# Patient Record
Sex: MALE | Race: WHITE | NOT HISPANIC OR LATINO | Employment: FULL TIME | URBAN - METROPOLITAN AREA
[De-identification: names, ages, dates, MRNs, and addresses within clinical notes are randomized per-mention and may not be internally consistent; named-entity substitution may affect disease eponyms.]

---

## 2021-07-16 ENCOUNTER — OFFICE VISIT (OUTPATIENT)
Dept: INTERNAL MEDICINE | Facility: CLINIC | Age: 36
End: 2021-07-16

## 2021-07-16 VITALS
BODY MASS INDEX: 28.25 KG/M2 | SYSTOLIC BLOOD PRESSURE: 122 MMHG | TEMPERATURE: 98.3 F | OXYGEN SATURATION: 97 % | WEIGHT: 201.8 LBS | HEART RATE: 68 BPM | HEIGHT: 71 IN | DIASTOLIC BLOOD PRESSURE: 85 MMHG

## 2021-07-16 DIAGNOSIS — R06.09 DOE (DYSPNEA ON EXERTION): Primary | ICD-10-CM

## 2021-07-16 DIAGNOSIS — Z30.09 VASECTOMY EVALUATION: ICD-10-CM

## 2021-07-16 PROCEDURE — 99203 OFFICE O/P NEW LOW 30 MIN: CPT | Performed by: INTERNAL MEDICINE

## 2021-07-16 NOTE — PROGRESS NOTES
"Chief Complaint  Establish Care    Subjective          Job Woodward presents to [facility]  History of Present Illness  Previous PCP:  Edwina monae   Specialist(s): n/a  Immunizations: UTD  Colon cancer screening: n/a  Pt gets labs every year with [facility]    Pt would like to see urology for vasectomy follow-up.  Pt does report having intermittent SOB. 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 Pt reports having a cough for approx a year after he returned from deployment.   pt reports previous x-ray normal    Objective   Vital Signs:   /85 (BP Location: Left arm, Patient Position: Sitting, Cuff Size: Adult)   Pulse 68   Temp 98.3 °F (36.8 °C) (Temporal)   Ht 180.3 cm (71\")   Wt 91.5 kg (201 lb 12.8 oz)   SpO2 97%   BMI 28.15 kg/m²     Physical Exam   Appearance: No acute distress, well-nourished  Head: normocephalic, atraumatic  Eyes: extraocular movements intact, no scleral icterus, no conjunctival injection  Ears, Nose, and Throat: external ears normal, nares patent, moist mucous membranes  Cardiovascular: regular rate and rhythm. no murmurs, rales, or rhonchi. no edema  Respiratory: breathing comfortably, symmetric chest rise, clear to auscultation bilaterally. No wheezes, rales, or rhonchi.  Neuro: alert and oriented to time, place, and person. Normal gait  Psych: normal mood and affect     Assessment and Plan    Diagnoses and all orders for this visit:    1. JAVIER (dyspnea on exertion) (Primary)  -     Full Pulmonary Function Test With Bronchodilator; Future    2. Vasectomy evaluation  -     Ambulatory Referral to Urology        Follow Up   No follow-ups on file.  Patient was given instructions and counseling regarding his condition or for health maintenance advice. Please see specific information pulled into the AVS if appropriate.       "

## 2023-02-20 ENCOUNTER — OFFICE VISIT (OUTPATIENT)
Dept: INTERNAL MEDICINE | Facility: CLINIC | Age: 38
End: 2023-02-20
Payer: COMMERCIAL

## 2023-02-20 VITALS
BODY MASS INDEX: 30.3 KG/M2 | DIASTOLIC BLOOD PRESSURE: 92 MMHG | WEIGHT: 216.4 LBS | HEIGHT: 71 IN | SYSTOLIC BLOOD PRESSURE: 135 MMHG | OXYGEN SATURATION: 98 % | HEART RATE: 78 BPM | TEMPERATURE: 97.5 F

## 2023-02-20 DIAGNOSIS — Z31.41 FERTILITY TESTING: Primary | ICD-10-CM

## 2023-02-20 LAB
FSH SERPL-ACNC: 4.4 MIU/ML
LH SERPL-ACNC: 4.47 MIU/ML
PROLACTIN SERPL-MCNC: 12.8 NG/ML (ref 4.04–15.2)
TSH SERPL DL<=0.05 MIU/L-ACNC: 1.6 UIU/ML (ref 0.27–4.2)

## 2023-02-20 PROCEDURE — 84403 ASSAY OF TOTAL TESTOSTERONE: CPT | Performed by: STUDENT IN AN ORGANIZED HEALTH CARE EDUCATION/TRAINING PROGRAM

## 2023-02-20 PROCEDURE — 83002 ASSAY OF GONADOTROPIN (LH): CPT | Performed by: STUDENT IN AN ORGANIZED HEALTH CARE EDUCATION/TRAINING PROGRAM

## 2023-02-20 PROCEDURE — 84443 ASSAY THYROID STIM HORMONE: CPT | Performed by: STUDENT IN AN ORGANIZED HEALTH CARE EDUCATION/TRAINING PROGRAM

## 2023-02-20 PROCEDURE — 84402 ASSAY OF FREE TESTOSTERONE: CPT | Performed by: STUDENT IN AN ORGANIZED HEALTH CARE EDUCATION/TRAINING PROGRAM

## 2023-02-20 PROCEDURE — 84146 ASSAY OF PROLACTIN: CPT | Performed by: STUDENT IN AN ORGANIZED HEALTH CARE EDUCATION/TRAINING PROGRAM

## 2023-02-20 PROCEDURE — 99213 OFFICE O/P EST LOW 20 MIN: CPT | Performed by: STUDENT IN AN ORGANIZED HEALTH CARE EDUCATION/TRAINING PROGRAM

## 2023-02-20 PROCEDURE — 83001 ASSAY OF GONADOTROPIN (FSH): CPT | Performed by: STUDENT IN AN ORGANIZED HEALTH CARE EDUCATION/TRAINING PROGRAM

## 2023-02-20 RX ORDER — CHLORAL HYDRATE 500 MG
CAPSULE ORAL
COMMUNITY

## 2023-02-20 RX ORDER — SENNOSIDES 8.6 MG
TABLET ORAL
COMMUNITY

## 2023-02-20 RX ORDER — DIPHENOXYLATE HYDROCHLORIDE AND ATROPINE SULFATE 2.5; .025 MG/1; MG/1
TABLET ORAL DAILY
COMMUNITY

## 2023-02-20 RX ORDER — MELATONIN
1000 DAILY
COMMUNITY

## 2023-02-20 RX ORDER — ASCORBIC ACID 100 MG
TABLET,CHEWABLE ORAL
COMMUNITY

## 2023-02-20 NOTE — PROGRESS NOTES
"Chief Complaint  Fatigue (Discuss testosterone/ fertility lab work)    Subjective          Job Woodward presents to [facility]  History of Present Illness    Herre with wife.    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    Current Outpatient Medications   Medication Instructions   • Ascorbic Acid (Vitamin C) 100 MG chewable tablet Oral   • cholecalciferol (VITAMIN D3) 1,000 Units, Oral, Daily   • Coenzyme Q10 (CoQ-10) 400 MG capsule Oral   • multivitamin (ONE-A-DAY MENS PO) Oral, Daily, Pre-conception   • Nutritional Supplements (CONCEPTIONXR REPRODUCTIVE PO) Oral   • Omega-3 Fatty Acids (fish oil) 1000 MG capsule capsule Oral, Daily With Breakfast   • Zinc 50 MG capsule Oral       The following portions of the patient's history were reviewed and updated as appropriate: allergies, current medications, past family history, past medical history, past social history, past surgical history, and problem list.    Objective   Vital Signs:   /92   Pulse 78   Temp 97.5 °F (36.4 °C) (Temporal)   Ht 180.3 cm (71\")   Wt 98.2 kg (216 lb 6.4 oz)   SpO2 98%   BMI 30.18 kg/m²     Wt Readings from Last 3 Encounters:   02/20/23 98.2 kg (216 lb 6.4 oz)   07/16/21 91.5 kg (201 lb 12.8 oz)     BP Readings from Last 3 Encounters:   02/20/23 135/92   07/16/21 122/85     Physical Exam  Vitals reviewed.   Constitutional:       General: He is not in acute distress.     Appearance: Normal appearance. He is not ill-appearing, toxic-appearing or diaphoretic.   HENT:      Head: Normocephalic and atraumatic.      Right Ear: External ear normal.      Left Ear: External ear normal.   Eyes:      Conjunctiva/sclera: Conjunctivae normal.   Cardiovascular:      Rate and Rhythm: Normal rate and regular rhythm.      Pulses: Normal pulses.      Heart sounds: Normal heart sounds. No murmur heard.    No friction rub. No gallop.   Pulmonary:      Effort: " Pulmonary effort is normal. No respiratory distress.      Breath sounds: Normal breath sounds. No stridor. No wheezing, rhonchi or rales.   Chest:      Chest wall: No tenderness.   Abdominal:      General: Abdomen is flat.      Palpations: Abdomen is soft. There is no mass.      Tenderness: There is no abdominal tenderness.   Musculoskeletal:      Right lower leg: No edema.      Left lower leg: No edema.   Skin:     General: Skin is warm and dry.   Neurological:      General: No focal deficit present.      Mental Status: He is alert. Mental status is at baseline.   Psychiatric:         Mood and Affect: Mood normal.         Behavior: Behavior normal.         Thought Content: Thought content normal.         Judgment: Judgment normal.         Result Review :   The following data was reviewed by: Jamil Alcaraz MD on 02/20/2023:    I spent 20 minutes caring for Job on this date of service. This time includes time spent by me in the following activities:preparing for the visit, reviewing tests, obtaining and/or reviewing a separately obtained history, performing a medically appropriate examination and/or evaluation , counseling and educating the patient/family/caregiver, ordering medications, tests, or procedures and documenting information in the medical record         No results found for: SARSANTIGEN, COVID19, RAPFLUA, RAPFLUB, FLUAAG, FLUABDAG, FLUABDAG, FLU, FLU, FLUBAG, RAPSCRN, STREPAAG, RSV, POCPREGUR, MONOSPOT, INR, LEADCAPBLD, POCLEAD, BILIRUBINUR    Procedures        Assessment and Plan    Diagnoses and all orders for this visit:    1. Fertility testing (Primary)  -     TSH  -     FSH & LH  -     Prolactin  -     Testosterone, Free, Total          There are no discontinued medications.       Follow Up   Return if symptoms worsen or fail to improve.  Patient was given instructions and counseling regarding his condition or for health maintenance advice. Please see specific information pulled into the AVS  if appropriate.       Jamil Alcaraz MD  03/13/23  08:17 EDT

## 2023-02-25 LAB
TESTOST FREE SERPL-MCNC: 6.1 PG/ML (ref 8.7–25.1)
TESTOST SERPL-MCNC: 253 NG/DL (ref 264–916)

## 2023-05-05 ENCOUNTER — OFFICE VISIT (OUTPATIENT)
Dept: INTERNAL MEDICINE | Facility: CLINIC | Age: 38
End: 2023-05-05
Payer: COMMERCIAL

## 2023-05-05 VITALS
WEIGHT: 205 LBS | BODY MASS INDEX: 28.7 KG/M2 | HEART RATE: 79 BPM | OXYGEN SATURATION: 98 % | DIASTOLIC BLOOD PRESSURE: 89 MMHG | TEMPERATURE: 98.9 F | HEIGHT: 71 IN | SYSTOLIC BLOOD PRESSURE: 130 MMHG

## 2023-05-05 DIAGNOSIS — Z00.00 ANNUAL PHYSICAL EXAM: Primary | ICD-10-CM

## 2023-05-05 NOTE — PROGRESS NOTES
"Chief Complaint  Annual physical. Patient needs letter stating tinnitus.     Subjective          Job Woodward presents to [facility]  History of Present Illness  Patient reports feeling well. He had audiology exam showing mild-moderate hearing loss. He also reports ongoing tinnitus.     No current outpatient medications    The following portions of the patient's history were reviewed and updated as appropriate: allergies, current medications, past family history, past medical history, past social history, past surgical history, and problem list.    Objective   Vital Signs:   /89 (BP Location: Left arm)   Pulse 79   Temp 98.9 °F (37.2 °C) (Temporal)   Ht 180.3 cm (71\")   Wt 93 kg (205 lb)   SpO2 98%   BMI 28.59 kg/m²     Wt Readings from Last 3 Encounters:   05/05/23 93 kg (205 lb)   02/20/23 98.2 kg (216 lb 6.4 oz)   07/16/21 91.5 kg (201 lb 12.8 oz)     BP Readings from Last 3 Encounters:   05/05/23 130/89   02/20/23 135/92   07/16/21 122/85     Physical Exam   Appearance: No acute distress, well-nourished  Head: normocephalic, atraumatic  Eyes: extraocular movements intact, no scleral icterus, no conjunctival injection  Ears, Nose, and Throat: external ears normal, nares patent, moist mucous membranes  Cardiovascular: regular rate and rhythm. no murmurs, rubs, or gallops. no edema  Respiratory: breathing comfortably, symmetric chest rise, clear to auscultation bilaterally. No wheezes, rales, or rhonchi.  Neuro: alert and oriented to time, place, and person. Normal gait  Psych: normal mood and affect     Result Review :   The following data was reviewed by: Shukri Hercules Jr, MD on 05/05/2023:        No results found for: SARSANTIGEN, COVID19, RAPFLUA, RAPFLUB, FLUAAG, FLUABDAG, FLUABDAG, FLU, FLU, FLUBAG, RAPSCRN, STREPAAG, RSV, POCPREGUR, MONOSPOT, INR, LEADCAPBLD, POCLEAD, BILIRUBINUR       Assessment and Plan    Diagnoses and all orders " for this visit:    1. Annual physical exam (Primary)  Comments:  labs and hearing eval from prior exam reviewed.       Advised on diet, physical activity, etc      Medications Discontinued During This Encounter   Medication Reason   • Ascorbic Acid (Vitamin C) 100 MG chewable tablet *Therapy completed   • cholecalciferol (VITAMIN D3) 25 MCG (1000 UT) tablet *Therapy completed   • Coenzyme Q10 (CoQ-10) 400 MG capsule *Therapy completed   • multivitamin (THERAGRAN) tablet tablet *Therapy completed   • Nutritional Supplements (CONCEPTIONXR REPRODUCTIVE PO) *Therapy completed   • Omega-3 Fatty Acids (fish oil) 1000 MG capsule capsule *Therapy completed   • Zinc 50 MG capsule *Therapy completed          Follow Up   Return in about 1 year (around 5/5/2024) for Annual physical.  Patient was given instructions and counseling regarding his condition or for health maintenance advice. Please see specific information pulled into the AVS if appropriate.       Shukri Hercules Jr, MD  05/05/23  14:13 EDT

## 2023-05-08 ENCOUNTER — TELEPHONE (OUTPATIENT)
Dept: INTERNAL MEDICINE | Facility: CLINIC | Age: 38
End: 2023-05-08
Payer: COMMERCIAL

## 2023-05-08 NOTE — TELEPHONE ENCOUNTER
Caller: Job Woodward    Relationship to patient: Self    Best call back number: [phone]    Patient is needing: PATIENT CALLED ABOUT A LETTER FOR . HE STATED  SAID IT WOULD BE READY Monday. PLEASE CALL AND ADVISE.

## 2023-05-09 ENCOUNTER — TELEPHONE (OUTPATIENT)
Dept: INTERNAL MEDICINE | Facility: CLINIC | Age: 38
End: 2023-05-09
Payer: COMMERCIAL

## 2023-05-09 NOTE — TELEPHONE ENCOUNTER
Called patient back- he stated that the letter was okay that I had done but he needed a little more to the letter .   It needed to say that its not going to affect nothing, and no limitation.   His right ear is worse than his left

## 2023-05-09 NOTE — TELEPHONE ENCOUNTER
Caller: Job Woodward    Relationship to patient: Self    Best call back number: [phone]    Patient is needing: PATIENT CALLED IN AND IS REQUESTING A CALL BACK FROM DR. WU REGARDING HIS PHYSICIAN LETTER PLEASE

## 2025-01-24 ENCOUNTER — HOSPITAL ENCOUNTER (OUTPATIENT)
Dept: OTHER | Facility: HOSPITAL | Age: 40
Discharge: HOME | End: 2025-01-24

## 2025-01-29 ENCOUNTER — TRANSCRIBE ORDERS (OUTPATIENT)
Dept: SLEEP MEDICINE | Facility: HOSPITAL | Age: 40
End: 2025-01-29
Payer: OTHER GOVERNMENT

## 2025-01-29 ENCOUNTER — HOSPITAL ENCOUNTER (OUTPATIENT)
Dept: SLEEP MEDICINE | Facility: HOSPITAL | Age: 40
Discharge: HOME | End: 2025-01-29
Admitting: NURSE PRACTITIONER
Payer: OTHER GOVERNMENT

## 2025-01-29 DIAGNOSIS — G47.33 OBSTRUCTIVE SLEEP APNEA (ADULT) (PEDIATRIC): Primary | ICD-10-CM

## 2025-01-29 DIAGNOSIS — G47.33 OBSTRUCTIVE SLEEP APNEA (ADULT) (PEDIATRIC): ICD-10-CM

## 2025-01-29 PROCEDURE — 95810 POLYSOM 6/> YRS 4/> PARAM: CPT

## 2025-03-15 ENCOUNTER — HOSPITAL ENCOUNTER (OUTPATIENT)
Dept: OTHER | Facility: HOSPITAL | Age: 40
Discharge: HOME | End: 2025-03-15

## 2025-04-15 ENCOUNTER — OFFICE VISIT (OUTPATIENT)
Dept: INTERNAL MEDICINE | Facility: CLINIC | Age: 40
End: 2025-04-15
Payer: COMMERCIAL

## 2025-04-15 VITALS
SYSTOLIC BLOOD PRESSURE: 131 MMHG | BODY MASS INDEX: 29.43 KG/M2 | TEMPERATURE: 98.9 F | DIASTOLIC BLOOD PRESSURE: 89 MMHG | OXYGEN SATURATION: 95 % | HEIGHT: 71 IN | WEIGHT: 210.2 LBS | HEART RATE: 81 BPM

## 2025-04-15 DIAGNOSIS — F33.42 RECURRENT MAJOR DEPRESSIVE DISORDER, IN FULL REMISSION: ICD-10-CM

## 2025-04-15 DIAGNOSIS — R91.1 LUNG NODULE: ICD-10-CM

## 2025-04-15 DIAGNOSIS — J32.0 CHRONIC MAXILLARY SINUSITIS: ICD-10-CM

## 2025-04-15 DIAGNOSIS — Z00.00 ANNUAL PHYSICAL EXAM: Primary | ICD-10-CM

## 2025-04-15 DIAGNOSIS — R06.09 DYSPNEA ON EXERTION: ICD-10-CM

## 2025-04-15 DIAGNOSIS — G47.33 OSA (OBSTRUCTIVE SLEEP APNEA): ICD-10-CM

## 2025-04-15 DIAGNOSIS — S49.91XD ARM INJURY, RIGHT, SUBSEQUENT ENCOUNTER: ICD-10-CM

## 2025-04-15 PROBLEM — K21.9 GASTROESOPHAGEAL REFLUX DISEASE WITHOUT ESOPHAGITIS: Status: ACTIVE | Noted: 2025-04-15

## 2025-04-15 RX ORDER — SILDENAFIL 100 MG/1
100 TABLET, FILM COATED ORAL DAILY PRN
COMMUNITY

## 2025-04-15 RX ORDER — FLUTICASONE PROPIONATE 50 MCG
2 SPRAY, SUSPENSION (ML) NASAL DAILY
Qty: 32 G | Refills: 3 | Status: SHIPPED | OUTPATIENT
Start: 2025-04-15

## 2025-04-15 RX ORDER — HYDROXYZINE HYDROCHLORIDE 25 MG/1
25 TABLET, FILM COATED ORAL 3 TIMES DAILY PRN
COMMUNITY

## 2025-04-15 RX ORDER — VENLAFAXINE 75 MG/1
75 TABLET ORAL 2 TIMES DAILY
COMMUNITY

## 2025-04-15 RX ORDER — AZELASTINE 1 MG/ML
2 SPRAY, METERED NASAL 2 TIMES DAILY
Qty: 60 ML | Refills: 3 | Status: SHIPPED | OUTPATIENT
Start: 2025-04-15

## 2025-04-15 NOTE — PROGRESS NOTES
"Chief Complaint  breathing issue (Wants to look at a couple of things /NODULES IN LUNGS, DISCUSS CONCERNS WITH GERD AND SINUSES), Hiatal Hernia, Heartburn, and cpap (Patient currently using Cpap )    Subjective          Job Woodward presents to [facility]  History of Present Illness  [facility]  for 20 years. He reports chronic breathing problems. His breathing was much worse since going to [facility]. He reports his breathing and voice have not returned to baseline. He reports pulmonary function test previously that was reassuring.   Patient reports having right shoulder injury recently during training exercise. He had right shoulder MRI completed. Patient is follow-up with orthopedics for further management.    GENO- patient is on CPAP machine. He continue to follow-up with sleep medicine.     Current Outpatient Medications   Medication Instructions    azelastine (ASTELIN) 0.1 % nasal spray 2 sprays, Nasal, 2 Times Daily, Use in each nostril as directed    fluticasone (FLONASE) 50 MCG/ACT nasal spray 2 sprays, Nasal, Daily    hydrOXYzine (ATARAX) 25 mg, 3 Times Daily PRN    sildenafil (VIAGRA) 100 mg, Daily PRN    venlafaxine (EFFEXOR) 75 mg, 2 Times Daily       The following portions of the patient's history were reviewed and updated as appropriate: allergies, current medications, past family history, past medical history, past social history, past surgical history, and problem list.    Objective   Vital Signs:   /89 (BP Location: Left arm, Patient Position: Sitting, Cuff Size: Adult)   Pulse 81   Temp 98.9 °F (37.2 °C) (Temporal)   Ht 180.3 cm (71\")   Wt 95.3 kg (210 lb 3.2 oz)   SpO2 95%   BMI 29.32 kg/m²     BP Readings from Last 3 Encounters:   04/15/25 131/89   05/05/23 130/89   02/20/23 135/92     Wt Readings from Last 3 Encounters:   04/15/25 95.3 kg (210 lb 3.2 oz)   05/05/23 93 kg (205 lb) " "  02/20/23 98.2 kg (216 lb 6.4 oz)     Physical Exam   Appearance: No acute distress, well-nourished  Head: normocephalic, atraumatic  Eyes: extraocular movements intact, no scleral icterus, no conjunctival injection  Ears, Nose, and Throat: external ears normal, nares patent, moist mucous membranes  Cardiovascular: regular rate and rhythm. no murmurs, rubs, or gallops. no edema  Respiratory: breathing comfortably, symmetric chest rise, clear to auscultation bilaterally. No wheezes, rales, or rhonchi.  Neuro: alert and oriented to time, place, and person. Normal gait  Psych: normal mood and affect     Result Review :   The following data was reviewed by: Shukri Hercules Jr, MD on 04/15/2025:      No results found for: \"SARSANTIGEN\", \"COVID19\", \"RAPFLUA\", \"RAPFLUB\", \"FLUAAG\", \"FLUABDAG\", \"FLU\", \"FLUBAG\", \"RAPSCRN\", \"STREPAAG\", \"RSV\", \"POCPREGUR\", \"MONOSPOT\", \"INR\", \"LEADCAPBLD\", \"POCLEAD\", \"BILIRUBINUR\"       Assessment and Plan    Diagnoses and all orders for this visit:    1. Annual physical exam (Primary)    2. Lung nodule  Comments:  noted on imaging study. will obtain results for my review.    3. Arm injury, right, subsequent encounter  Comments:  cont f/u with ortho. MRI completed    4. Dyspnea on exertion  Comments:  discussed high-res CT. PFT and chest CT obtained previously.    5. GENO (obstructive sleep apnea)  Comments:  cont CPAP and f/u with sleep.    6. Chronic maxillary sinusitis  Comments:  refer to ENT. cont flonase and azelastine prn  Orders:  -     fluticasone (FLONASE) 50 MCG/ACT nasal spray; Administer 2 sprays into the nostril(s) as directed by provider Daily.  Dispense: 32 g; Refill: 3  -     azelastine (ASTELIN) 0.1 % nasal spray; Administer 2 sprays into the nostril(s) as directed by provider 2 (Two) Times a Day. Use in each nostril as directed  Dispense: 60 mL; Refill: 3  -     Ambulatory Referral to ENT (Otolaryngology)    7. Recurrent major depressive disorder, in full " remission  Comments:  cont f/u with psych. cont effexor    Advised on diet, physical activity, etc      There are no discontinued medications.       Follow Up   Return in about 1 year (around 4/15/2026) for Annual.  Patient was given instructions and counseling regarding his condition or for health maintenance advice. Please see specific information pulled into the AVS if appropriate.       Shukri Hercules Jr, MD  04/15/25  14:15 EDT

## 2025-04-16 ENCOUNTER — OFFICE VISIT (OUTPATIENT)
Dept: ORTHOPEDIC SURGERY | Facility: CLINIC | Age: 40
End: 2025-04-16
Payer: OTHER GOVERNMENT

## 2025-04-16 VITALS
WEIGHT: 200 LBS | DIASTOLIC BLOOD PRESSURE: 84 MMHG | HEIGHT: 71 IN | OXYGEN SATURATION: 96 % | SYSTOLIC BLOOD PRESSURE: 127 MMHG | BODY MASS INDEX: 28 KG/M2 | HEART RATE: 78 BPM

## 2025-04-16 DIAGNOSIS — M75.41 IMPINGEMENT SYNDROME OF RIGHT SHOULDER: ICD-10-CM

## 2025-04-16 DIAGNOSIS — M25.511 RIGHT SHOULDER PAIN, UNSPECIFIED CHRONICITY: Primary | ICD-10-CM

## 2025-04-16 RX ORDER — TRIAMCINOLONE ACETONIDE 40 MG/ML
40 INJECTION, SUSPENSION INTRA-ARTICULAR; INTRAMUSCULAR
Status: COMPLETED | OUTPATIENT
Start: 2025-04-16 | End: 2025-04-16

## 2025-04-16 RX ORDER — LIDOCAINE HYDROCHLORIDE 10 MG/ML
5 INJECTION, SOLUTION INFILTRATION; PERINEURAL
Status: COMPLETED | OUTPATIENT
Start: 2025-04-16 | End: 2025-04-16

## 2025-04-16 RX ADMIN — LIDOCAINE HYDROCHLORIDE 5 ML: 10 INJECTION, SOLUTION INFILTRATION; PERINEURAL at 15:35

## 2025-04-16 RX ADMIN — TRIAMCINOLONE ACETONIDE 40 MG: 40 INJECTION, SUSPENSION INTRA-ARTICULAR; INTRAMUSCULAR at 15:35

## 2025-04-16 NOTE — PROGRESS NOTES
"Chief Complaint  Pain and Initial Evaluation of the Right Shoulder    Subjective          Job Woodward presents to [facility] for   History of Present Illness    Job presents today for evaluation of his right shoulder.  He does describes right shoulder pain that started in June or July of last year while doing the sprint, drag, carry physical test.  He describes lateral shoulder pain and a catch.  He has not had any formal treatment.  No prior shoulder surgery.      No Known Allergies     Social History     Socioeconomic History   • Marital status:    • Number of children: 2   Tobacco Use   • Smoking status: Never   • Smokeless tobacco: Never   Vaping Use   • Vaping status: Never Used   Substance and Sexual Activity   • Alcohol use: Yes     Alcohol/week: 5.0 standard drinks of alcohol     Types: 5 Cans of beer per week     Comment: current some day, occasional   • Drug use: Never   • Sexual activity: Yes     Partners: Female     Birth control/protection: Vasectomy        I reviewed the patient's chief complaint, history of present illness, review of systems, past medical history, surgical history, family history, social history, medications, and allergy list.     REVIEW OF SYSTEMS    Constitutional: Denies fevers, chills, weight loss  Cardiovascular: Denies chest pain, shortness of breath  Skin: Denies rashes, acute skin changes  Neurologic: Denies headache, loss of consciousness  MSK: Right shoulder pain      Objective   Vital Signs:   /84   Pulse 78   Ht 180.3 cm (71\")   Wt 90.7 kg (200 lb)   SpO2 96%   BMI 27.89 kg/m²     Body mass index is 27.89 kg/m².    Physical Exam    General: Alert. No acute distress.   Right upper extremity: No point tenderness.  180 degrees of elevation, 45 degrees of external rotation, internal rotation to lower lumbar spine.  5 out of 5 rotator cuff testing.  Positive impingement.  Positive dynamic labral shear.  Distal " neurovascular intact.    Large Joint Arthrocentesis: R subacromial bursa  Date/Time: 4/16/2025 3:35 PM  Consent given by: patient  Site marked: site marked  Timeout: Immediately prior to procedure a time out was called to verify the correct patient, procedure, equipment, support staff and site/side marked as required   Supporting Documentation  Indications: pain   Procedure Details  Location: shoulder - R subacromial bursa  Preparation: Patient was prepped and draped in the usual sterile fashion  Needle gauge: 21G.  Medications administered: 40 mg triamcinolone acetonide 40 MG/ML; 5 mL lidocaine 1 %  Patient tolerance: patient tolerated the procedure well with no immediate complications      Imaging Results (Most Recent)       Procedure Component Value Units Date/Time    XR Shoulder 2+ View Right [[id number]] Resulted: 04/16/25 1518     Updated: 04/16/25 1518    Narrative:      Indications: Right shoulder pain    Views: AP, Grashey, Scap Y, axillary right shoulder    Findings: No fractures noted.  Mild AC joint degeneration.  Lateral   downsloping to the acromion.  Glenohumeral joint reduced.    Comparative Data: No comparative data available                     Assessment and Plan        XR Shoulder 2+ View Right  Result Date: 4/16/2025  Narrative: Indications: Right shoulder pain Views: AP, Grashey, Scap Y, axillary right shoulder Findings: No fractures noted.  Mild AC joint degeneration.  Lateral downsloping to the acromion.  Glenohumeral joint reduced. Comparative Data: No comparative data available       Diagnoses and all orders for this visit:    1. Right shoulder pain, unspecified chronicity (Primary)  -     XR Shoulder 2+ View Right    2. Impingement syndrome of right shoulder        We reviewed his MRI.  This showed AC joint arthrosis but no tears.  We discussed nonoperative management.  He is clinically symptomatic for impingement.  We discussed the risk, benefits, indications, alternatives to right  shoulder subacromial injection.  He elected proceed and tolerated the injection well.  Home exercise provided today.  Follow-up in 6 weeks for reevaluation.  No x-rays needed when he returns.  We could consider an MRI arthrogram if not improving.      Call or return if worsening symptoms.    Scribed for Hair Levy MD by Daren Rose  04/16/2025   14:15 EDT         Follow Up       6-week    Patient was given instructions and counseling regarding his condition or for health maintenance advice. Please see specific information pulled into the AVS if appropriate.     I have personally performed the services described in this document as scribed by the above individual and it is both accurate and complete. Hair Levy MD 04/16/25 15:34 EDT

## 2025-06-09 ENCOUNTER — TELEPHONE (OUTPATIENT)
Dept: INTERNAL MEDICINE | Facility: CLINIC | Age: 40
End: 2025-06-09

## 2025-06-09 NOTE — TELEPHONE ENCOUNTER
Caller: Job Woodward    Relationship: Self    Best call back number: [phone]    What is the best time to reach you:     Who are you requesting to speak with (clinical staff, provider,  specific staff member): NURSE     What was the call regarding: PATIENT IS NEEDING TO WRITE 3 [employer] LETTER AND WANTED TO DISCUSS WITH PCP

## 2025-06-10 NOTE — TELEPHONE ENCOUNTER
"Tried to call patient no answer left Vm     Relay     \"Would schedule next available follow-up appointment. \"                "

## 2025-06-11 NOTE — PROGRESS NOTES
Chief Complaint  [facility]work for [facility]    Subjective          Job Woodward presents to [facility]  History of Present Illness  History of Present Illness  The patient presents for evaluation of dysphagia, allergic rhinitis, sinusitis, and cough.    The patient requests a [employer] letter for dysphagia, which they attribute to a small hiatal hernia and possibly gastroesophageal reflux disease (GERD). They report experiencing odynophagia and choking during deglutition, irrespective of the type of food. An appointment for further evaluation is scheduled for next month with gastroenterology.    Additionally, the patient seeks a [employer] letter for chronic cough, allergic rhinitis, and sinusitis, conditions they have managed for several years. 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 The patient is registered in the burn pit registry. Post-service health assessments have documented respiratory difficulties and chronic cough, which have been managed with antihistamines. Imaging studies, including chest computed tomography (CT) and pulmonary function tests have been [facility] completed within the [facility]. The patient has no history of tobacco use and reports that Fluticasone nasal spray (Flonase) provides symptomatic relief.    The patient also has a diagnosis of obstructive sleep apnea.        Current Outpatient Medications   Medication Instructions    azelastine (ASTELIN) 0.1 % nasal spray 2 sprays, Nasal, 2 Times Daily, Use in each nostril as directed    fluticasone (FLONASE) 50 MCG/ACT nasal spray 2 sprays, Nasal, Daily    hydrOXYzine (ATARAX) 25 mg, 3 Times Daily PRN    sildenafil (VIAGRA) 100 mg, Daily PRN    venlafaxine (EFFEXOR) 75 mg, 2 Times Daily       The following portions of the patient's history were reviewed and  "updated as appropriate: allergies, current medications, past family history, past medical history, past social history, past surgical history, and problem list.    Objective   Vital Signs:   /86   Pulse 80   Temp 98.4 °F (36.9 °C)   Ht 180.3 cm (71\")   Wt 96.2 kg (212 lb)   SpO2 98%   BMI 29.57 kg/m²     BP Readings from Last 3 Encounters:   06/13/25 121/86   04/16/25 127/84   04/15/25 131/89     Wt Readings from Last 3 Encounters:   06/13/25 96.2 kg (212 lb)   06/13/25 90.7 kg (200 lb)   04/16/25 90.7 kg (200 lb)     Physical Exam   Appearance: No acute distress, well-nourished  Head: normocephalic, atraumatic  Eyes: extraocular movements intact, no scleral icterus, no conjunctival injection  Ears, Nose, and Throat: external ears normal, nares patent, moist mucous membranes  Cardiovascular: regular rate and rhythm. no murmurs, rubs, or gallops. no edema  Respiratory: breathing comfortably, symmetric chest rise, clear to auscultation bilaterally. No wheezes, rales, or rhonchi.  Neuro: alert and oriented to time, place, and person. Normal gait  Psych: normal mood and affect     Result Review :   The following data was reviewed by: Shukri Hercules Jr, MD on 06/13/2025:      No results found for: \"SARSANTIGEN\", \"COVID19\", \"RAPFLUA\", \"RAPFLUB\", \"FLUAAG\", \"FLUABDAG\", \"FLU\", \"FLUBAG\", \"RAPSCRN\", \"STREPAAG\", \"RSV\", \"POCPREGUR\", \"MONOSPOT\", \"INR\", \"LEADCAPBLD\", \"POCLEAD\", \"BILIRUBINUR\"         Assessment and Plan    Diagnoses and all orders for this visit:    1. Gastroesophageal reflux disease without esophagitis (Primary)  Comments:  With dysphagia.  Continue follow-up with GI for further eval and EGD.    2. Chronic maxillary sinusitis    3. Allergic rhinitis, unspecified seasonality, unspecified trigger  Comments:  Continue Flonase.    4. Chronic cough  Comments:  Patient to for chest CT results.  Also obtain PFT to further eval.  Orders:  -     Complete PFT - Pre & Post Bronchodilator; Future      For " [employer] letter in support, I have requested patient [facility]  Patient also to deliver instructions regarding the [employer] letter.  I have recommended patient have additional PFT completed to further evaluate condition and severity.    There are no discontinued medications.       Follow Up   Return if symptoms worsen or fail to improve.  Patient was given instructions and counseling regarding his condition or for health maintenance advice. Please see specific information pulled into the AVS if appropriate.       Shukri Hercules Jr, MD  06/13/25  13:26 EDT

## 2025-06-13 ENCOUNTER — OFFICE VISIT (OUTPATIENT)
Dept: INTERNAL MEDICINE | Facility: CLINIC | Age: 40
End: 2025-06-13
Payer: COMMERCIAL

## 2025-06-13 ENCOUNTER — OFFICE VISIT (OUTPATIENT)
Dept: ORTHOPEDIC SURGERY | Facility: CLINIC | Age: 40
End: 2025-06-13
Payer: OTHER GOVERNMENT

## 2025-06-13 VITALS
BODY MASS INDEX: 29.68 KG/M2 | DIASTOLIC BLOOD PRESSURE: 86 MMHG | WEIGHT: 212 LBS | HEIGHT: 71 IN | OXYGEN SATURATION: 98 % | TEMPERATURE: 98.4 F | SYSTOLIC BLOOD PRESSURE: 121 MMHG | HEART RATE: 80 BPM

## 2025-06-13 VITALS — HEART RATE: 65 BPM | WEIGHT: 200 LBS | OXYGEN SATURATION: 98 % | BODY MASS INDEX: 28 KG/M2 | HEIGHT: 71 IN

## 2025-06-13 DIAGNOSIS — J30.9 ALLERGIC RHINITIS, UNSPECIFIED SEASONALITY, UNSPECIFIED TRIGGER: ICD-10-CM

## 2025-06-13 DIAGNOSIS — M75.41 IMPINGEMENT SYNDROME OF RIGHT SHOULDER: Primary | ICD-10-CM

## 2025-06-13 DIAGNOSIS — M19.011 ARTHRITIS OF RIGHT ACROMIOCLAVICULAR JOINT: ICD-10-CM

## 2025-06-13 DIAGNOSIS — J32.0 CHRONIC MAXILLARY SINUSITIS: ICD-10-CM

## 2025-06-13 DIAGNOSIS — R05.3 CHRONIC COUGH: ICD-10-CM

## 2025-06-13 DIAGNOSIS — K21.9 GASTROESOPHAGEAL REFLUX DISEASE WITHOUT ESOPHAGITIS: Primary | ICD-10-CM

## 2025-06-13 RX ORDER — LIDOCAINE HYDROCHLORIDE 10 MG/ML
5 INJECTION, SOLUTION INFILTRATION; PERINEURAL
Status: COMPLETED | OUTPATIENT
Start: 2025-06-13 | End: 2025-06-13

## 2025-06-13 RX ORDER — TRIAMCINOLONE ACETONIDE 40 MG/ML
40 INJECTION, SUSPENSION INTRA-ARTICULAR; INTRAMUSCULAR
Status: COMPLETED | OUTPATIENT
Start: 2025-06-13 | End: 2025-06-13

## 2025-06-13 RX ADMIN — LIDOCAINE HYDROCHLORIDE 5 ML: 10 INJECTION, SOLUTION INFILTRATION; PERINEURAL at 08:51

## 2025-06-13 RX ADMIN — TRIAMCINOLONE ACETONIDE 40 MG: 40 INJECTION, SUSPENSION INTRA-ARTICULAR; INTRAMUSCULAR at 08:51

## 2025-06-13 NOTE — PROGRESS NOTES
"Chief Complaint  Follow-up and Pain of the Right Shoulder    Subjective          Job Woodward presents to [facility] for   Pain      Job returns today for follow-up of his right shoulder.  He has right shoulder impingement and AC joint arthritis.  He had an MRI and his rotator cuff was intact.  We did a subacromial injection previously.  He got good relief from the injection but his pain is returning.  He has pain over the AC joint as well.  No new injuries.      No Known Allergies     Social History     Socioeconomic History    Marital status:     Number of children: 2   Tobacco Use    Smoking status: Never    Smokeless tobacco: Never    Tobacco comments:     Never used tobacco!   Vaping Use    Vaping status: Never Used   Substance and Sexual Activity    Alcohol use: Not Currently     Alcohol/week: 10.0 standard drinks of alcohol     Types: 5 Cans of beer, 5 Shots of liquor per week     Comment: Heavy use on active duty. Copeing with stress and anxiety.    Drug use: Never    Sexual activity: Yes     Partners: Female     Birth control/protection: Vasectomy        I reviewed the patient's chief complaint, history of present illness, review of systems, past medical history, surgical history, family history, social history, medications, and allergy list.     REVIEW OF SYSTEMS    Constitutional: Denies fevers, chills, weight loss  Cardiovascular: Denies chest pain, shortness of breath  Skin: Denies rashes, acute skin changes  Neurologic: Denies headache, loss of consciousness  MSK: Right shoulder pain      Objective   Vital Signs:   Pulse 65   Ht 180.3 cm (71\")   Wt 90.7 kg (200 lb)   SpO2 98%   BMI 27.89 kg/m²     Body mass index is 27.89 kg/m².    Physical Exam    General: Alert. No acute distress.   Right upper extremity: Point tender AC joint. 180 degrees of elevation, 45 degrees of external rotation, internal rotation to lower lumbar spine.  5 out of 5 rotator " cuff testing.  Positive impingement.  Positive dynamic labral shear.  Distal neurovascular intact.     Large Joint: R subacromial bursa  Date/Time: 6/13/2025 8:51 AM  Consent given by: patient  Site marked: site marked  Timeout: Immediately prior to procedure a time out was called to verify the correct patient, procedure, equipment, support staff and site/side marked as required   Supporting Documentation  Indications: pain   Procedure Details  Location: shoulder - R subacromial bursa  Preparation: Patient was prepped and draped in the usual sterile fashion  Needle gauge: 21 G.  Medications administered: 5 mL lidocaine 1 %; 40 mg triamcinolone acetonide 40 MG/ML  Patient tolerance: patient tolerated the procedure well with no immediate complications      This injection documentation was Scribed for Hair Levy MD by Alondra Castro MA.  06/13/25   08:51 EDT    Imaging Results (Most Recent)       None                     Assessment and Plan        No results found.     Diagnoses and all orders for this visit:    1. Impingement syndrome of right shoulder (Primary)    2. Arthritis of right acromioclavicular joint    Other orders  -     Large Joint: R subacromial bursa        We discussed the risk, benefits, indications, and alternatives to a right shoulder steroid injection.  He elected proceed and tolerated the injection well.  Continue home exercises and activity modifications.  He may have injections every 3 months as needed.  We may consider MRI arthrogram in the future for evaluation of his labrum if not improving.        Call or return if worsening symptoms.    Scribed for Hair Levy MD by Alondra Castro MA  06/13/2025   08:51 EDT         Follow Up     3 months    Patient was given instructions and counseling regarding his condition or for health maintenance advice. Please see specific information pulled into the AVS if appropriate.       I have personally performed the services described in this document as  scribed by the above individual and it is both accurate and complete. Hair Levy MD 06/13/25 10:22 EDT

## 2025-06-16 ENCOUNTER — TELEPHONE (OUTPATIENT)
Dept: INTERNAL MEDICINE | Facility: CLINIC | Age: 40
End: 2025-06-16
Payer: OTHER GOVERNMENT

## 2025-06-16 NOTE — TELEPHONE ENCOUNTER
Additional information regarding [employer] letter    Please call pt for further information or when letter is complete

## 2025-06-23 ENCOUNTER — PATIENT MESSAGE (OUTPATIENT)
Dept: INTERNAL MEDICINE | Facility: CLINIC | Age: 40
End: 2025-06-23
Payer: OTHER GOVERNMENT

## 2025-06-30 ENCOUNTER — TELEPHONE (OUTPATIENT)
Dept: INTERNAL MEDICINE | Facility: CLINIC | Age: 40
End: 2025-06-30
Payer: OTHER GOVERNMENT

## 2025-06-30 NOTE — TELEPHONE ENCOUNTER
Sent a GLAMS[employer] message to patient.    Patient Message with Shukri Hercules Jr., MD (06/23/2025)

## 2025-06-30 NOTE — TELEPHONE ENCOUNTER
Hub staff attempted to follow warm transfer process and was unsuccessful     Caller: Job Woodward    Relationship to patient: Self    Best call back number: [phone]    Patient is needing: PATIENT SPOKE WITH ASSISTANT VIA RIVS ON 06/13/2025 AND 06/23/2025 BUT SO FAR, HE HAS HAD NO UPDATE ON THE PAPERWORK. HE WOULD LIKE CALL BACK WITH UPDATE AS SOON AS POSSIBLE.

## 2025-07-03 ENCOUNTER — OFFICE VISIT (OUTPATIENT)
Dept: OTOLARYNGOLOGY | Facility: CLINIC | Age: 40
End: 2025-07-03
Payer: COMMERCIAL

## 2025-07-03 VITALS
SYSTOLIC BLOOD PRESSURE: 120 MMHG | WEIGHT: 209 LBS | HEIGHT: 71 IN | HEART RATE: 80 BPM | DIASTOLIC BLOOD PRESSURE: 87 MMHG | TEMPERATURE: 98.7 F | BODY MASS INDEX: 29.26 KG/M2

## 2025-07-03 DIAGNOSIS — R44.8 FACIAL PRESSURE: ICD-10-CM

## 2025-07-03 DIAGNOSIS — R09.81 CHRONIC NASAL CONGESTION: Primary | ICD-10-CM

## 2025-07-03 NOTE — PATIENT INSTRUCTIONS
Neilmed Saline Nasal Rinse  http://[email or web address]/usa/directions-for-use.php        Step 1  Please wash your hands. Fill the clean bottle with the designated volume of either distilled, micro-filtered (through 0.2 micron filter), commercially bottled or previously boiled and cooled down water. Always rinse your nasal passages with NeilMed® Sinus Rinse™ packets only. Our packets contain a mixture of USP grade sodium chloride and sodium bicarbonate. These ingredients are of the purest quality available to make the dry powder mixture. Rinsing your nasal passages with only plain water without our mixture will result in a severe burning sensation as the plain water is not physiologic for your nasal lining, even if it is appropriate for drinking. Additionally, for your safety, do not use tap or faucet water for dissolving the mixture unless it has been previously boiled for five minutes or more as boiling sterilizes the water. Other choices are distilled, micro-filtered (through 0.2 micron), commercially bottled or, as mentioned earlier, previously boiled water at lukewarm or body temperature. You can store boiled water in a clean container for seven days or more if refrigerated. Do not use non-chlorinated or non-ultra (0.2 micron) filtered well water unless it is boiled and then cooled to lukewarm or body temperature.  *You may warm the water in a microwave in increments of 5 to 10 seconds to avoid overheating the water, damaging the device or scalding your nasal passage.   Step 2  Cut the Sinus Rinse™ mixture packet at the corner and pour its contents into the bottle. Tighten the cap and tube on the bottle securely. Place one finger over the tip of the cap and shake the bottle gently to dissolve the mixture.   Step 3  Standing in front of a sink, bend forward to your comfort level and tilt your head down. Keeping your mouth open, without holding your breath, place the cap snugly against your nasal passage. SQUEEZE  BOTTLE GENTLY until the solution starts draining from the OPPOSITE nasal passage. Some may drain from your mouth. For a proper rinse, keep squeezing the bottle GENTLY until at least 1/4 to 1/2 (60 mL to 120 mL or 2 to 4 fl oz) of the bottle is used. Do not swallow the solution.   Step 4  Blow your nose very gently, without pinching nose completely to avoid pressure on eardrums. If tolerable, sniff in gently any residual solution remaining in the nasal passage once or twice, because this may clean out the posterior nasopharyngeal area, which is the area at the back of your nasal passage. At times, some solution will reach the back of your throat, so please spit it out. To help drain any residual solution, blow your nose gently while tilting your head forward and to the opposite side of the nasal passage you just rinsed.  Step 5  Now repeat steps 3 and 4 for your other nasal passage. Most users fi nd that rinsing twice a day is beneficial, similar to brushing your teeth. Many doctors recommend rinsing 3-4 times daily or for special circumstances, even rinsing up to 6 times a day is safe. Please follow your physician’s advice.    Proper Instillation of Intranasal sprays:  Blow nose to clear nostrils.  Place the tip of the nozzle in one nostril and close the other nostril with your finger.  Aim slightly away from the center of your nose, toward the corner of the eye, press the white nozzle, and sniff the mist in gently. Be careful not to spray into your eyes.  Exhale through the mouth.  Repeat process in other nostril.   -The recommended starting dose for Flonase in adults is 2 sprays in each nostril once a day.  -Your provider may have recommended use of nasal saline or nasal navage use to rinse your sinuses. If this is the case, it would be recommended use use saline prior to instillation of intranasal medications to rinse the sinus surface prior to the medication.  -Avoid use of nasal saline or nasal rinses within 30  minutes after intranasal medication use, as to allow the medication to fully absorb.

## 2025-07-03 NOTE — PROGRESS NOTES
Patient Name: Job Woodward   Visit Date: 07/03/2025   Patient ID: [id number]  Provider: ISELA Xiao    Sex: male  [facility]   YOB: 1985  Location Address: [address] [place] [address], [address] [place] [address],?[id number]    Primary Care Provider Shukri Hercules Jr., MD  Location Phone: [phone]    Referring Provider: Shukri Hercules *        Chief Complaint  Establish Care and chronic maxillary sinusitis    History of Present Illness  Job Woodward is a 40 y.o. male who presents to [facility] for Establish Care and chronic maxillary sinusitis  Patient referred to ENT by Dr. Hercules on 4/15/2025 for evaluation of chronic maxillary sinusitis.  Patient currently on azelastine and Flonase.  History of Present Illness  The patient is a 43-year-old male who presents for sinus issues.    He has been experiencing difficulty breathing through his nose, which he attributes to his exposure to silica dust during his time as a  in [place] in 2009.  Also had significant chemical exposure from chemical sprays and the [place] [employer]. this exposure led to severe respiratory issues, including a persistent cough that lasted for a year. He also experienced frequent sinus infections, characterized by yellow-green discharge, facial pressure, and postnasal drip. These symptoms often disrupted his sleep due to coughing and difficulty breathing. He reports migraines due to the sinus pressure. Currently, he reports no green or yellow drainage but experiences monthly flare-ups. He has been using Flonase for several years to manage his sinus issues, which has provided significant relief. His primary care physician also prescribed azelastine.     He also reports severe hearing loss and owns two sets of hearing aids, which he does not use. He works in a noisy environment and plans to continue working for another 20 years. He  "has not had any exposure to explosions.          Vital Signs:  Vitals:    07/03/25 1556   BP: 120/87   Pulse: 80   Temp: 98.7 °F (37.1 °C)   Weight: 94.8 kg (209 lb)   Height: 180.3 cm (71\")        Past Medical History:   Diagnosis Date    Allergic rhinitis 2009    After deployment to [facility]    Anxiety     Asthma 2009    After deployment to [facility]    Dizziness 2009    GERD (gastroesophageal reflux disease) 2009    Had a hernia or ulcer treated with antacid prescribed by     Headache 2009    Get them so bad in [place] i would throw up    Hernia ?    Put on antiacid 2009    HL (hearing loss) Due to  service     in [facility]    Prostatitis     Recurrent upper respiratory infection (URI) 2009    After deployment to [facility]    Rotator cuff syndrome 2004    Injury on active duty    Sleep apnea 2025    [facility]    Tinnitus 2009    After years of [employer]     TMJ dysfunction 2009    After deployment  also on active duty was told my tonsils were inflamed and swolen       Past Surgical History:   Procedure Laterality Date    CYST REMOVAL      VASECTOMY  2012         Current Outpatient Medications:     azelastine (ASTELIN) 0.1 % nasal spray, Administer 2 sprays into the nostril(s) as directed by provider 2 (Two) Times a Day. Use in each nostril as directed, Disp: 60 mL, Rfl: 3    fluticasone (FLONASE) 50 MCG/ACT nasal spray, Administer 2 sprays into the nostril(s) as directed by provider Daily., Disp: 32 g, Rfl: 3    hydrOXYzine (ATARAX) 25 MG tablet, Take 1 tablet by mouth 3 (Three) Times a Day As Needed for Itching., Disp: , Rfl:     sildenafil (VIAGRA) 100 MG tablet, Take 1 tablet by mouth Daily As Needed for Erectile Dysfunction., Disp: , Rfl:     venlafaxine (EFFEXOR) 75 MG tablet, Take 1 tablet by mouth 2 (Two) Times a Day., Disp: , Rfl:      No Known Allergies    Social History     Tobacco Use    Smoking status: Never    Smokeless tobacco: Never    Tobacco comments:    "  Never used tobacco!   Vaping Use    Vaping status: Never Used   Substance Use Topics    Alcohol use: Not Currently     Alcohol/week: 10.0 standard drinks of alcohol     Types: 5 Cans of beer, 5 Shots of liquor per week     Comment: Heavy use on active duty. Copeing with stress and anxiety.    Drug use: Never        Objective     Tobacco Use: Low Risk  (7/3/2025)    Patient History     Smoking Tobacco Use: Never     Smokeless Tobacco Use: Never     Passive Exposure: Not on file         Physical Exam    Constitutional   Appearance  well developed, well-nourished, alert and in no acute distress, voice clear and strong    Head   Inspection  no deformities or lesions, atraumatic    Face   Inspection  no facial lesions; House-Brackmann I/VI bilaterally   Palpation  no TMJ crepitus nor  muscle tenderness bilaterally     Eyes/Vision   Visual Fields  extraocular movements are intact, no spontaneous or gaze-induced nystagmus  Conjunctivae  clear   Sclerae  clear   Pupils and Irises  pupils equal, round, and reactive to light.   Nystagmus  not present     Ears, Nose, Mouth and Throat  Ears  External Ears  Auricles appearance within normal limits, no lesions present   Otoscopic Examination  tympanic membrane appearance within normal limits bilaterally without perforations, well-aerated middle ears without evidence of effusion  Hearing  intact to conversational voice both ears   Tunning fork testing    Rinne:  Mckeon:    Nose  External Nose  appearance normal   Intranasal Exam  mucosa diffuse mild erythema with bilateral inferior middle turbinate hypertrophy, vestibules normal, no intranasal lesions present, septum midline with left septal spur, sinuses non tender to percussion   Modified Humberto Test:    Oral Cavity  Oral Mucosa  oral mucosa normal without pallor or cyanosis   Stensen's and Warthin's ducts are productive and patent with clear saliva  Lips  lip appearance normal   Teeth  normal dentition for age  "  Gums  gums pink, non-swollen, no bleeding present   Tongue  tongue appearance normal; normal mobility   Palate  hard palate normal, soft palate appearance normal with symmetric mobility     Throat  Oropharynx  no inflammation or lesions present  Tonsils  Bilateral tonsils unremarkable  Hypopharynx  appearance within normal limits   Larynx  voice normal     Neck  Inspection/Palpation  normal appearance, no masses or tenderness, trachea midline; thyroid size normal, nontender, no nodules or masses present on palpation     Lymphatic  Neck  no lymphadenopathy present   Supraclavicular Nodes  no lymphadenopathy present   Preauricular Nodes  no lymphadenopathy present     Respiratory  Respiratory Effort  breathing unlabored   Inspection of Chest  normal appearance, no retractions     Musculoskeletal   Cervical back: Normal range of motion and neck supple.      Skin and Subcutaneous Tissue  General Inspection  Regarding face and neck - there are no rashes present, no lesions present, and no areas of discoloration     Neurologic  Cranial Nerves  Alert and oriented x3  cranial nerves II-XII are grossly intact bilaterally   Gait and Station  normal gait, able to stand without diffculty    Psychiatric  Judgement and Insight  judgment and insight intact   Mood and Affect  mood normal, affect appropriate       RESULTS REVIEWED    I have reviewed the following information:   []  Previous Internal Note  [x]  Previous External Note:   [x]  Ordered Tests & Results:      Pathology: No results found for: \"MICRO\"    TSH   Date Value Ref Range Status   02/20/2023 1.600 0.270 - 4.200 uIU/mL Final       No Images in the past 120 days found..      I have discussed the interpretation of the above results with the patient.    Procedures          Assessment and Plan   Diagnoses and all orders for this visit:    1. Chronic nasal congestion (Primary)  -     CT Sinus Without Contrast; Future    2. Facial pressure  -     CT Sinus Without " Contrast; Future        Assessment & Plan  1. Sinusitis.  Symptoms suggest chronic sinusitis, likely due to mucosal thickening from exposure to dust and chemicals, leading to narrowed sinus openings and recurrent infections. The presence of a bone spur on the left side of the septum was noted, likely developmental rather than exposure-related. A CT scan will be ordered to evaluate the extent of mucosal thickening and potential narrowing of the sinus openings. Continue using Flonase and azelastine, along with nasal saline rinses twice daily. If the CT scan reveals significant narrowing, surgical intervention may be considered.    2. Hearing loss.  Significant hearing loss reported, with [facility]. Consistent use of noise protection is advised due to the work environment. A copy of the hearing test will be requested for further evaluation.    3. Hiatal hernia.  History of GERD and hiatal hernia identified on a previous CT scan. Symptoms include choking and rapid eating. Scheduled to discuss the possibility of an EGD with the doctor next month to further evaluate the hernia and associated symptoms.    Follow-up  A follow-up appointment will be scheduled in 1 month to review the results of the CT scan.      (R09.81) Chronic nasal congestion - Plan: CT Sinus Without Contrast    (R44.8) Facial pressure - Plan: CT Sinus Without Contrast     Job Woodward  reports that he has never smoked. He has never used smokeless tobacco.       Plan:  Patient Instructions   Neilmed Saline Nasal Rinse  http://www.[employer].com/usa/directions-for-use.php        Step 1  Please wash your hands. Fill the clean bottle with the designated volume of either distilled, micro-filtered (through 0.2 micron filter), commercially bottled or previously boiled and cooled down water. Always rinse your nasal passages with NeilMed® Sinus Rinse™ packets only. Our packets contain a mixture of USP grade sodium chloride  and sodium bicarbonate. These ingredients are of the purest quality available to make the dry powder mixture. Rinsing your nasal passages with only plain water without our mixture will result in a severe burning sensation as the plain water is not physiologic for your nasal lining, even if it is appropriate for drinking. Additionally, for your safety, do not use tap or faucet water for dissolving the mixture unless it has been previously boiled for five minutes or more as boiling sterilizes the water. Other choices are distilled, micro-filtered (through 0.2 micron), commercially bottled or, as mentioned earlier, previously boiled water at lukewarm or body temperature. You can store boiled water in a clean container for seven days or more if refrigerated. Do not use non-chlorinated or non-ultra (0.2 micron) filtered well water unless it is boiled and then cooled to lukewarm or body temperature.  *You may warm the water in a microwave in increments of 5 to 10 seconds to avoid overheating the water, damaging the device or scalding your nasal passage.   Step 2  Cut the Sinus Rinse™ mixture packet at the corner and pour its contents into the bottle. Tighten the cap and tube on the bottle securely. Place one finger over the tip of the cap and shake the bottle gently to dissolve the mixture.   Step 3  Standing in front of a sink, bend forward to your comfort level and tilt your head down. Keeping your mouth open, without holding your breath, place the cap snugly against your nasal passage. SQUEEZE BOTTLE GENTLY until the solution starts draining from the OPPOSITE nasal passage. Some may drain from your mouth. For a proper rinse, keep squeezing the bottle GENTLY until at least 1/4 to 1/2 (60 mL to 120 mL or 2 to 4 fl oz) of the bottle is used. Do not swallow the solution.   Step 4  Blow your nose very gently, without pinching nose completely to avoid pressure on eardrums. If tolerable, sniff in gently any residual solution  remaining in the nasal passage once or twice, because this may clean out the posterior nasopharyngeal area, which is the area at the back of your nasal passage. At times, some solution will reach the back of your throat, so please spit it out. To help drain any residual solution, blow your nose gently while tilting your head forward and to the opposite side of the nasal passage you just rinsed.  Step 5  Now repeat steps 3 and 4 for your other nasal passage. Most users fi nd that rinsing twice a day is beneficial, similar to brushing your teeth. Many doctors recommend rinsing 3-4 times daily or for special circumstances, even rinsing up to 6 times a day is safe. Please follow your physician’s advice.    Proper Instillation of Intranasal sprays:  Blow nose to clear nostrils.  Place the tip of the nozzle in one nostril and close the other nostril with your finger.  Aim slightly away from the center of your nose, toward the corner of the eye, press the white nozzle, and sniff the mist in gently. Be careful not to spray into your eyes.  Exhale through the mouth.  Repeat process in other nostril.   -The recommended starting dose for Flonase in adults is 2 sprays in each nostril once a day.  -Your provider may have recommended use of nasal saline or nasal navage use to rinse your sinuses. If this is the case, it would be recommended use use saline prior to instillation of intranasal medications to rinse the sinus surface prior to the medication.  -Avoid use of nasal saline or nasal rinses within 30 minutes after intranasal medication use, as to allow the medication to fully absorb.           Follow Up   Return in about 4 weeks (around 7/31/2025), or with CT sinus.  Patient was given instructions and counseling regarding his condition or for health maintenance advice. Please see specific information pulled into the AVS if appropriate.      Patient or patient representative verbalized consent for the use of Ambient Listening  during the visit with  SIELA Xiao for chart documentation. 7/3/2025  16:26 EDT    All or a portion of this Note was dictated utilizing Dragon Dictation.

## 2025-07-16 ENCOUNTER — HOSPITAL ENCOUNTER (OUTPATIENT)
Dept: CT IMAGING | Facility: HOSPITAL | Age: 40
Discharge: HOME | End: 2025-07-16
Payer: COMMERCIAL

## 2025-07-16 DIAGNOSIS — R44.8 FACIAL PRESSURE: ICD-10-CM

## 2025-07-16 DIAGNOSIS — R09.81 CHRONIC NASAL CONGESTION: ICD-10-CM

## 2025-07-16 PROCEDURE — 70486 CT MAXILLOFACIAL W/O DYE: CPT

## 2025-07-29 ENCOUNTER — PREP FOR SURGERY (OUTPATIENT)
Dept: OTHER | Facility: HOSPITAL | Age: 40
End: 2025-07-29
Payer: OTHER GOVERNMENT

## 2025-07-29 ENCOUNTER — CLINICAL SUPPORT (OUTPATIENT)
Dept: GASTROENTEROLOGY | Facility: CLINIC | Age: 40
End: 2025-07-29

## 2025-07-29 DIAGNOSIS — K21.9 GASTROESOPHAGEAL REFLUX DISEASE WITHOUT ESOPHAGITIS: ICD-10-CM

## 2025-07-29 DIAGNOSIS — R13.10 DYSPHAGIA, UNSPECIFIED TYPE: Primary | ICD-10-CM

## 2025-07-29 DIAGNOSIS — R05.9 COUGH, UNSPECIFIED TYPE: ICD-10-CM

## 2025-07-29 DIAGNOSIS — R11.11 VOMITING WITHOUT NAUSEA, UNSPECIFIED VOMITING TYPE: ICD-10-CM

## 2025-07-29 RX ORDER — OMEPRAZOLE 20 MG/1
20 CAPSULE, DELAYED RELEASE ORAL DAILY
COMMUNITY
End: 2025-08-04 | Stop reason: HOSPADM

## 2025-07-29 RX ORDER — PRAZOSIN HYDROCHLORIDE 1 MG/1
3 CAPSULE ORAL NIGHTLY
COMMUNITY

## 2025-07-29 RX ORDER — ALBUTEROL SULFATE 90 UG/1
2 INHALANT RESPIRATORY (INHALATION) EVERY 4 HOURS PRN
COMMUNITY

## 2025-07-29 NOTE — PROGRESS NOTES
Job Woodward  1985  40 y.o.    Reason for call: GERD and Dysphagia cough and vomitting when coughing - Per Dr Silverman to [facility]arence to schedule EGD    Date/Place of last Scope: n/a      Family history of colon cancer? No        Prep instructions reviewed with patient in office - brochure provided to patient - My Chart message will be sent w/ arrival time.    Is the patient currently on any injectable or oral medications for weight loss or diabetes? No    Clearance needed? No Per pt - seen by pulmonary once and cleared without follow up needed    If yes, what clearance is needed? N/A      The patient has been scheduled for: EGD        After your procedure, you will be contacted with results. Please confirm the best phone # to reach the patient: [phone]     Tentative Procedure Date: 8/4/2025 - [facility]        Family History   Problem Relation Age of Onset    Other Paternal Grandfather         Skin Carcinoma     Past Medical History:   Diagnosis Date    Allergic rhinitis 2009    After deployment to [facility]    Anxiety     Asthma 2009    After deployment to [facility]    Dizziness 2009    GERD (gastroesophageal reflux disease) 2009    Had a hernia or ulcer treated with antacid prescribed by     Headache 2009    Get them so bad in [place] i would throw up    Hernia ?    Put on antiacid 2009    HL (hearing loss) Due to  service     in [employer]    Prostatitis     Recurrent upper respiratory infection (URI) 2009    After deployment to [facility]    Rotator cuff syndrome 2004    Injury on active duty    Sleep apnea 2025    [facility]    Tinnitus 2009    After years of [employer]     TMJ dysfunction 2009    After deployment  also on active duty was told my tonsils were inflamed and swolen     No Known Allergies  Past Surgical History:   Procedure Laterality Date    CYST REMOVAL      VASECTOMY  2012     Social History     Socioeconomic History    Marital status:     Number of  children: 2   Tobacco Use    Smoking status: Never    Smokeless tobacco: Never    Tobacco comments:     Never used tobacco!   Vaping Use    Vaping status: Never Used   Substance and Sexual Activity    Alcohol use: Not Currently     Alcohol/week: 10.0 standard drinks of alcohol     Types: 5 Cans of beer, 5 Shots of liquor per week     Comment: Heavy use on active duty. Copeing with stress and anxiety.    Drug use: Never    Sexual activity: Yes     Partners: Female     Birth control/protection: Vasectomy       Current Outpatient Medications:     albuterol sulfate  (90 Base) MCG/ACT inhaler, Inhale 2 puffs Every 4 (Four) Hours As Needed for Wheezing., Disp: , Rfl:     azelastine (ASTELIN) 0.1 % nasal spray, Administer 2 sprays into the nostril(s) as directed by provider 2 (Two) Times a Day. Use in each nostril as directed, Disp: 60 mL, Rfl: 3    fluticasone (FLONASE) 50 MCG/ACT nasal spray, Administer 2 sprays into the nostril(s) as directed by provider Daily., Disp: 32 g, Rfl: 3    hydrOXYzine (ATARAX) 25 MG tablet, Take 1 tablet by mouth 3 (Three) Times a Day As Needed for Itching., Disp: , Rfl:     omeprazole (priLOSEC) 20 MG capsule, Take 1 capsule by mouth Daily., Disp: , Rfl:     prazosin (MINIPRESS) 1 MG capsule, Take 3 capsules by mouth Every Night., Disp: , Rfl:     sildenafil (VIAGRA) 100 MG tablet, Take 1 tablet by mouth Daily As Needed for Erectile Dysfunction., Disp: , Rfl:     venlafaxine (EFFEXOR) 75 MG tablet, Take 1 tablet by mouth 2 (Two) Times a Day., Disp: , Rfl:

## 2025-08-01 ENCOUNTER — ANESTHESIA EVENT (OUTPATIENT)
Dept: GASTROENTEROLOGY | Facility: HOSPITAL | Age: 40
End: 2025-08-01
Payer: OTHER GOVERNMENT

## 2025-08-04 ENCOUNTER — ANESTHESIA (OUTPATIENT)
Dept: GASTROENTEROLOGY | Facility: HOSPITAL | Age: 40
End: 2025-08-04
Payer: OTHER GOVERNMENT

## 2025-08-04 ENCOUNTER — HOSPITAL ENCOUNTER (OUTPATIENT)
Facility: HOSPITAL | Age: 40
Setting detail: HOSPITAL OUTPATIENT SURGERY
Discharge: HOME | End: 2025-08-04
Attending: INTERNAL MEDICINE | Admitting: INTERNAL MEDICINE
Payer: OTHER GOVERNMENT

## 2025-08-04 VITALS
BODY MASS INDEX: 29.64 KG/M2 | TEMPERATURE: 97.4 F | RESPIRATION RATE: 24 BRPM | HEART RATE: 71 BPM | DIASTOLIC BLOOD PRESSURE: 79 MMHG | SYSTOLIC BLOOD PRESSURE: 97 MMHG | WEIGHT: 212.52 LBS | OXYGEN SATURATION: 96 %

## 2025-08-04 DIAGNOSIS — K21.9 GASTROESOPHAGEAL REFLUX DISEASE WITHOUT ESOPHAGITIS: ICD-10-CM

## 2025-08-04 DIAGNOSIS — R13.10 DYSPHAGIA, UNSPECIFIED TYPE: ICD-10-CM

## 2025-08-04 DIAGNOSIS — R05.9 COUGH, UNSPECIFIED TYPE: ICD-10-CM

## 2025-08-04 DIAGNOSIS — R11.11 VOMITING WITHOUT NAUSEA, UNSPECIFIED VOMITING TYPE: ICD-10-CM

## 2025-08-04 PROCEDURE — 25010000002 PROPOFOL 10 MG/ML EMULSION: Performed by: NURSE ANESTHETIST, CERTIFIED REGISTERED

## 2025-08-04 PROCEDURE — 88305 TISSUE EXAM BY PATHOLOGIST: CPT | Performed by: INTERNAL MEDICINE

## 2025-08-04 PROCEDURE — 25810000003 LACTATED RINGERS PER 1000 ML: Performed by: NURSE ANESTHETIST, CERTIFIED REGISTERED

## 2025-08-04 PROCEDURE — 25010000002 LIDOCAINE PF 2% 2 % SOLUTION: Performed by: NURSE ANESTHETIST, CERTIFIED REGISTERED

## 2025-08-04 RX ORDER — PROPOFOL 10 MG/ML
VIAL (ML) INTRAVENOUS AS NEEDED
Status: DISCONTINUED | OUTPATIENT
Start: 2025-08-04 | End: 2025-08-04 | Stop reason: SURG

## 2025-08-04 RX ORDER — SUCRALFATE 1 G/1
1 TABLET ORAL 4 TIMES DAILY
Qty: 120 TABLET | Refills: 2 | Status: SHIPPED | OUTPATIENT
Start: 2025-08-04 | End: 2025-11-02

## 2025-08-04 RX ORDER — PANTOPRAZOLE SODIUM 40 MG/1
40 TABLET, DELAYED RELEASE ORAL
Qty: 60 TABLET | Refills: 2 | Status: SHIPPED | OUTPATIENT
Start: 2025-08-04 | End: 2025-11-02

## 2025-08-04 RX ORDER — LIDOCAINE HYDROCHLORIDE 20 MG/ML
INJECTION, SOLUTION EPIDURAL; INFILTRATION; INTRACAUDAL; PERINEURAL AS NEEDED
Status: DISCONTINUED | OUTPATIENT
Start: 2025-08-04 | End: 2025-08-04 | Stop reason: SURG

## 2025-08-04 RX ORDER — DEXMEDETOMIDINE HYDROCHLORIDE 100 UG/ML
INJECTION, SOLUTION INTRAVENOUS AS NEEDED
Status: DISCONTINUED | OUTPATIENT
Start: 2025-08-04 | End: 2025-08-04 | Stop reason: SURG

## 2025-08-04 RX ORDER — SODIUM CHLORIDE, SODIUM LACTATE, POTASSIUM CHLORIDE, CALCIUM CHLORIDE 600; 310; 30; 20 MG/100ML; MG/100ML; MG/100ML; MG/100ML
30 INJECTION, SOLUTION INTRAVENOUS CONTINUOUS
Status: DISCONTINUED | OUTPATIENT
Start: 2025-08-04 | End: 2025-08-04 | Stop reason: HOSPADM

## 2025-08-04 RX ADMIN — SODIUM CHLORIDE, POTASSIUM CHLORIDE, SODIUM LACTATE AND CALCIUM CHLORIDE 30 ML/HR: 600; 310; 30; 20 INJECTION, SOLUTION INTRAVENOUS at 12:32

## 2025-08-04 RX ADMIN — PROPOFOL 50 MG: 10 INJECTION, EMULSION INTRAVENOUS at 13:05

## 2025-08-04 RX ADMIN — LIDOCAINE HYDROCHLORIDE 40 MG: 20 INJECTION, SOLUTION EPIDURAL; INFILTRATION; INTRACAUDAL; PERINEURAL at 13:16

## 2025-08-04 RX ADMIN — PROPOFOL 300 MCG/KG/MIN: 10 INJECTION, EMULSION INTRAVENOUS at 13:06

## 2025-08-04 RX ADMIN — PROPOFOL 50 MG: 10 INJECTION, EMULSION INTRAVENOUS at 13:07

## 2025-08-04 RX ADMIN — LIDOCAINE HYDROCHLORIDE 60 MG: 20 INJECTION, SOLUTION EPIDURAL; INFILTRATION; INTRACAUDAL; PERINEURAL at 13:05

## 2025-08-04 RX ADMIN — PROPOFOL 50 MG: 10 INJECTION, EMULSION INTRAVENOUS at 13:10

## 2025-08-04 RX ADMIN — DEXMEDETOMIDINE 10 MCG: 100 INJECTION, SOLUTION INTRAVENOUS at 13:05

## 2025-08-04 RX ADMIN — DEXMEDETOMIDINE 10 MCG: 100 INJECTION, SOLUTION INTRAVENOUS at 13:06

## 2025-08-06 LAB
CYTO UR: NORMAL
LAB AP CASE REPORT: NORMAL
LAB AP CLINICAL INFORMATION: NORMAL
PATH REPORT.FINAL DX SPEC: NORMAL
PATH REPORT.GROSS SPEC: NORMAL

## 2025-08-07 ENCOUNTER — PREP FOR SURGERY (OUTPATIENT)
Dept: OTHER | Facility: HOSPITAL | Age: 40
End: 2025-08-07
Payer: OTHER GOVERNMENT

## 2025-08-07 DIAGNOSIS — K20.80 LOS ANGELES GRADE D ESOPHAGITIS: Primary | ICD-10-CM

## 2025-08-08 ENCOUNTER — OFFICE VISIT (OUTPATIENT)
Dept: OTOLARYNGOLOGY | Facility: CLINIC | Age: 40
End: 2025-08-08
Payer: COMMERCIAL

## 2025-08-08 VITALS
HEART RATE: 72 BPM | HEIGHT: 71 IN | BODY MASS INDEX: 29.64 KG/M2 | SYSTOLIC BLOOD PRESSURE: 126 MMHG | DIASTOLIC BLOOD PRESSURE: 88 MMHG | TEMPERATURE: 98.2 F

## 2025-08-08 DIAGNOSIS — J34.3 NASAL TURBINATE HYPERTROPHY: ICD-10-CM

## 2025-08-08 DIAGNOSIS — J32.9 CHRONIC RHINOSINUSITIS: Primary | ICD-10-CM

## 2025-08-08 DIAGNOSIS — K21.00 GASTROESOPHAGEAL REFLUX DISEASE WITH ESOPHAGITIS WITHOUT HEMORRHAGE: ICD-10-CM

## 2025-08-08 DIAGNOSIS — J34.89 OSTIOMEATAL COMPLEX OBSTRUCTION OF NASAL SINUS: ICD-10-CM

## 2025-08-08 DIAGNOSIS — R44.8 FACIAL PRESSURE: ICD-10-CM

## 2025-08-08 DIAGNOSIS — J34.2 DEVIATED NASAL SEPTUM: ICD-10-CM

## 2025-08-11 ENCOUNTER — TELEPHONE (OUTPATIENT)
Dept: INTERNAL MEDICINE | Facility: CLINIC | Age: 40
End: 2025-08-11
Payer: OTHER GOVERNMENT

## (undated) DEVICE — SOLIDIFIER LIQLOC PLS 1500CC BT

## (undated) DEVICE — DEFENDO AIR WATER SUCTION AND BIOPSY VALVE KIT: Brand: DEFENDO AIR/WATER/SUCTION AND BIOPSY VALVE

## (undated) DEVICE — THE STERILE LIGHT HANDLE COVER IS USED WITH STERIS SURGICAL LIGHTING AND VISUALIZATION SYSTEMS.

## (undated) DEVICE — SINGLE-USE BIOPSY FORCEPS: Brand: RADIAL JAW 4

## (undated) DEVICE — Device

## (undated) DEVICE — CONN JET HYDRA H20 AUXILIARY DISP

## (undated) DEVICE — LINER SURG CANSTR SXN S/RIGD 1500CC

## (undated) DEVICE — SOL IRRG H2O PL/BG 1000ML STRL

## (undated) DEVICE — BLCK/BITE BLOX WO/DENTL/RIM W/STRAP 54F